# Patient Record
Sex: FEMALE | Race: WHITE | NOT HISPANIC OR LATINO
[De-identification: names, ages, dates, MRNs, and addresses within clinical notes are randomized per-mention and may not be internally consistent; named-entity substitution may affect disease eponyms.]

---

## 2018-12-19 ENCOUNTER — APPOINTMENT (OUTPATIENT)
Dept: OPHTHALMOLOGY | Facility: CLINIC | Age: 83
End: 2018-12-19
Payer: MEDICARE

## 2018-12-19 DIAGNOSIS — H40.002 PREGLAUCOMA, UNSPECIFIED, LEFT EYE: ICD-10-CM

## 2018-12-19 DIAGNOSIS — H35.3124 NONEXUDATIVE AGE-RELATED MACULAR DEGENERATION, LEFT EYE, ADVANCED ATROPHIC WITH SUBFOVEAL INVOLVEMENT: ICD-10-CM

## 2018-12-19 DIAGNOSIS — H35.3211 EXUDATIVE AGE-RELATED MACULAR DEGENERATION, RIGHT EYE, WITH ACTIVE CHOROIDAL NEOVASCULARIZATION: ICD-10-CM

## 2018-12-19 PROCEDURE — 92225: CPT | Mod: LT

## 2018-12-19 PROCEDURE — 92134 CPTRZ OPH DX IMG PST SGM RTA: CPT

## 2018-12-19 PROCEDURE — 92004 COMPRE OPH EXAM NEW PT 1/>: CPT

## 2020-01-01 ENCOUNTER — APPOINTMENT (OUTPATIENT)
Dept: HOME HEALTH SERVICES | Facility: HOME HEALTH | Age: 85
End: 2020-01-01

## 2020-01-14 ENCOUNTER — APPOINTMENT (OUTPATIENT)
Dept: INTERNAL MEDICINE | Facility: HOME HEALTH | Age: 85
End: 2020-01-14
Payer: MEDICARE

## 2020-01-14 ENCOUNTER — APPOINTMENT (OUTPATIENT)
Dept: INTERNAL MEDICINE | Facility: HOME HEALTH | Age: 85
End: 2020-01-14

## 2020-01-14 VITALS
DIASTOLIC BLOOD PRESSURE: 62 MMHG | RESPIRATION RATE: 18 BRPM | SYSTOLIC BLOOD PRESSURE: 154 MMHG | OXYGEN SATURATION: 98 % | TEMPERATURE: 98.7 F | HEART RATE: 74 BPM

## 2020-01-14 DIAGNOSIS — Z71.89 OTHER SPECIFIED COUNSELING: ICD-10-CM

## 2020-01-14 DIAGNOSIS — Z80.9 FAMILY HISTORY OF MALIGNANT NEOPLASM, UNSPECIFIED: ICD-10-CM

## 2020-01-14 DIAGNOSIS — Z78.9 OTHER SPECIFIED HEALTH STATUS: ICD-10-CM

## 2020-01-14 DIAGNOSIS — H54.3 UNQUALIFIED VISUAL LOSS, BOTH EYES: ICD-10-CM

## 2020-01-14 DIAGNOSIS — Z85.3 PERSONAL HISTORY OF MALIGNANT NEOPLASM OF BREAST: ICD-10-CM

## 2020-01-14 DIAGNOSIS — D50.9 IRON DEFICIENCY ANEMIA, UNSPECIFIED: ICD-10-CM

## 2020-01-14 PROCEDURE — G0506: CPT

## 2020-01-14 PROCEDURE — 99345 HOME/RES VST NEW HIGH MDM 75: CPT

## 2020-01-14 PROCEDURE — 99497 ADVNCD CARE PLAN 30 MIN: CPT

## 2020-01-14 RX ORDER — SITAGLIPTIN 100 MG/1
100 TABLET, FILM COATED ORAL
Qty: 90 | Refills: 3 | Status: ACTIVE | COMMUNITY
Start: 2020-01-14

## 2020-01-14 RX ORDER — LEVOTHYROXINE SODIUM 0.07 MG/1
75 TABLET ORAL
Qty: 90 | Refills: 3 | Status: ACTIVE | COMMUNITY
Start: 2020-01-14

## 2020-01-14 NOTE — REASON FOR VISIT
[Initial Evaluation] : an initial evaluation [Pre-Visit Preparation] : pre-visit preparation was done [Intercurrent Specialty/Sub-specialty Visits] : the patient has intercurrent specialty/sub-specialty visits [FreeTextEntry1] : Macular degeneration, Type 2 Diabetes with complications, right hip fracture s/p pinning, Lung Cancer [FreeTextEntry2] : Chart Review [FreeTextEntry3] : Orthopedics

## 2020-01-14 NOTE — COUNSELING
[Normal Weight - ( BMI  <25 )] : normal weight - ( BMI  <25 ) [Medical/Nutritional supplementation as prescribed] : medical/nutritional supplementation as prescribed [Non - Smoker] : non-smoker [Use assistive device to avoid falls] : use assistive device to avoid falls [] : diabetic screening [Decrease hospital use] : decrease hospital use [Discussed disease trajectory with patient/caregiver] : discussed disease trajectory with patient/caregiver [Minimize unnecessary interventions] : minimize unnecessary interventions [DNR] : Code Status: DNR [Completed Medical Orders for Life-Sustaining Treatment] : completed medical orders for life-sustaining treatment [Comfort] : Treatment Guidelines: Comfort [DNI] : Intubation: DNI [_____] : HCP: [unfilled] [[facility] _____[facility]/last verification date: [unfilled]

## 2020-01-14 NOTE — HISTORY OF PRESENT ILLNESS
[FreeTextEntry1] : blindness, hip fracture [FreeTextEntry2] : TUSHAR FERNÁNDEZ is an 89 year with Macular degeneration, Type 2 Diabetes with complications, right hip fracture s/p pinning, Lung Cancer seen today for initial home visit\par \par Accompanying patient today is TANYA Mayes and HCP/daughter contacted via cell phone.\par \par Patient's last hospitalization was 12/23- 12/28/19 for right hip fracture s/p pinning.\par \par Since hospitalization patient reports she continues to have occasional pain to her hip. Patient participating in home physical therapy and started to now stand. \par \par Patient reports no weight loss >10 lbs in the past 6 months. No changes in dentition or swallowing reported, No changes in hearing or vision reported. No changes in Cognition reported. Patient denies any symptoms of depression or anxiety. Patient is ADL dependent and IADL dependent. No changes in gait or falls reported. \par \par Patient's home environment is safe.\par  \par Goals of care discussed. MOLST completed. \par \par

## 2020-01-14 NOTE — REVIEW OF SYSTEMS
[Joint Stiffness] : joint stiffness [Muscle Weakness] : muscle weakness [Joint Pain] : joint pain [Muscle Pain] : muscle pain [Depression] : depression [Negative] : Heme/Lymph

## 2020-01-14 NOTE — HEALTH RISK ASSESSMENT
[HRA Reviewed] : Health risk assessment reviewed [Some assistance needed] : using telephone [Full assistance needed] : managing finances [Any fall with injury in past year] : Patient reported fall with injury in the past year [Yes] : The patient does have visual impairment [TimeGetUpGo] : 0

## 2020-01-14 NOTE — PHYSICAL EXAM
[Well Nourished] : well nourished [No Acute Distress] : no acute distress [Normal Oropharynx] : the oropharynx was normal [Normal Outer Ear/Nose] : the ears and nose were normal in appearance [No JVD] : no jugular venous distention [Normal TMs] : both tympanic membranes were normal [No Respiratory Distress] : no respiratory distress [Supple] : the neck was supple [Clear to Auscultation] : lungs were clear to auscultation bilaterally [No Accessory Muscle Use] : no accessory muscle use [Normal Rate] : heart rate was normal  [Regular Rhythm] : with a regular rhythm [Non Tender] : non-tender [Normal S1, S2] : normal S1 and S2 [Normal Bowel Sounds] : normal bowel sounds [Soft] : abdomen soft [Not Distended] : not distended [No Rectal Mass] : no rectal mass [Normal Sphincter Tone] : normal sphincter tone [Normal Post Cervical Nodes] : no posterior cervical lymphadenopathy [Normal Anterior Cervical Nodes] : no anterior cervical lymphadenopathy [No CVA Tenderness] : no ~M costovertebral angle tenderness [Cranial Nerves Intact] : cranial nerves 2-12 were intact [No Spinal Tenderness] : no spinal tenderness [No Joint Swelling] : no joint swelling seen [Oriented x3] : oriented to person, place, and time [No Motor Deficits] : the motor exam was normal [Normal Affect] : the affect was normal [Normal Mood] : the mood was normal [de-identified] : vision impaired [FreeTextEntry1] : +multiple small circular stools in rectal vault [de-identified] : stage 2 on right buttocks

## 2020-01-16 ENCOUNTER — LABORATORY RESULT (OUTPATIENT)
Age: 85
End: 2020-01-16

## 2020-01-17 RX ORDER — FLASH GLUCOSE SENSOR
KIT MISCELLANEOUS
Qty: 1 | Refills: 0 | Status: ACTIVE | COMMUNITY
Start: 2020-01-17 | End: 1900-01-01

## 2020-01-17 RX ORDER — FLASH GLUCOSE SENSOR
KIT MISCELLANEOUS
Qty: 2 | Refills: 3 | Status: ACTIVE | COMMUNITY
Start: 2020-01-17 | End: 1900-01-01

## 2020-02-10 RX ORDER — REPAGLINIDE 1 MG/1
1 TABLET ORAL
Qty: 270 | Refills: 1 | Status: DISCONTINUED | COMMUNITY
Start: 2020-01-14 | End: 2020-02-10

## 2020-02-11 ENCOUNTER — APPOINTMENT (OUTPATIENT)
Dept: HOME HEALTH SERVICES | Facility: HOME HEALTH | Age: 85
End: 2020-02-11
Payer: MEDICARE

## 2020-02-11 VITALS
TEMPERATURE: 98.1 F | RESPIRATION RATE: 18 BRPM | SYSTOLIC BLOOD PRESSURE: 138 MMHG | HEART RATE: 72 BPM | DIASTOLIC BLOOD PRESSURE: 78 MMHG | OXYGEN SATURATION: 98 %

## 2020-02-11 DIAGNOSIS — F33.0 MAJOR DEPRESSIVE DISORDER, RECURRENT, MILD: ICD-10-CM

## 2020-02-11 PROCEDURE — 99350 HOME/RES VST EST HIGH MDM 60: CPT

## 2020-02-11 NOTE — HISTORY OF PRESENT ILLNESS
[FreeTextEntry1] : blindness, hip fracture [FreeTextEntry2] : TUSHAR FERNÁNDEZ is an 89 year with Macular degeneration, Type 2 Diabetes with complications, right hip fracture s/p pinning, Lung Cancer seen today for follow up of chronic medical conditions.\par \par Accompanying patient today is TANYA Mayes and HCP/daughter.\par \par Since last home visit daughter reports patient with increase depressive symptoms. Patient's buttock wound is healing. No other complaints or events reported.\par \par Patient reports no weight loss >10 lbs in the past 6 months. No changes in dentition or swallowing reported, No changes in hearing or vision reported. No changes in Cognition reported. Patient denies any symptoms of depression or anxiety. Patient is ADL dependent and IADL dependent. No changes in gait or falls reported. \par \par Patient's home environment is safe.\par  \par Goals of care discussed. MOLST completed. \par \par

## 2020-02-11 NOTE — PHYSICAL EXAM
[No Acute Distress] : no acute distress [Well Nourished] : well nourished [Normal Outer Ear/Nose] : the ears and nose were normal in appearance [Normal Oropharynx] : the oropharynx was normal [No JVD] : no jugular venous distention [Normal TMs] : both tympanic membranes were normal [Supple] : the neck was supple [Clear to Auscultation] : lungs were clear to auscultation bilaterally [No Respiratory Distress] : no respiratory distress [No Accessory Muscle Use] : no accessory muscle use [Normal Rate] : heart rate was normal  [Regular Rhythm] : with a regular rhythm [Normal S1, S2] : normal S1 and S2 [Normal Bowel Sounds] : normal bowel sounds [Non Tender] : non-tender [Normal Sphincter Tone] : normal sphincter tone [Soft] : abdomen soft [Not Distended] : not distended [Normal Post Cervical Nodes] : no posterior cervical lymphadenopathy [No Rectal Mass] : no rectal mass [Normal Anterior Cervical Nodes] : no anterior cervical lymphadenopathy [No CVA Tenderness] : no ~M costovertebral angle tenderness [No Joint Swelling] : no joint swelling seen [No Spinal Tenderness] : no spinal tenderness [Cranial Nerves Intact] : cranial nerves 2-12 were intact [No Motor Deficits] : the motor exam was normal [Oriented x3] : oriented to person, place, and time [Normal Mood] : the mood was normal [Normal Affect] : the affect was normal [de-identified] : vision impaired [FreeTextEntry1] : +multiple small circular stools in rectal vault [de-identified] : stage 2 on right buttocks-healing, stage 1 left heel

## 2020-02-11 NOTE — REASON FOR VISIT
[Initial Evaluation] : an initial evaluation [Pre-Visit Preparation] : pre-visit preparation was done [Intercurrent Specialty/Sub-specialty Visits] : the patient has intercurrent specialty/sub-specialty visits [FreeTextEntry1] : Macular degeneration, Type 2 Diabetes with complications, right hip fracture s/p pinning, Lung Cancer [FreeTextEntry3] : Orthopedics [FreeTextEntry2] : Chart Review

## 2020-02-11 NOTE — REVIEW OF SYSTEMS
[Joint Pain] : joint pain [Joint Stiffness] : joint stiffness [Muscle Weakness] : muscle weakness [Muscle Pain] : muscle pain [Depression] : depression [Negative] : Heme/Lymph

## 2020-02-11 NOTE — CURRENT MEDS
[High Risk Medications Reviewed and Reconciled (Beers Criteria)] : high risk medications reviewed and reconciled [Medication and Allergies Reconciled] : medication and allergies reconciled [Reviewed patient reported medication adherence from Comprehensive Assessment] : Reviewed patient reported medication adherence from comprehensive assessment

## 2020-02-11 NOTE — COUNSELING
[Normal Weight - ( BMI  <25 )] : normal weight - ( BMI  <25 ) [Medical/Nutritional supplementation as prescribed] : medical/nutritional supplementation as prescribed [Use assistive device to avoid falls] : use assistive device to avoid falls [Non - Smoker] : non-smoker [] : eye exam [Decrease hospital use] : decrease hospital use [Discussed disease trajectory with patient/caregiver] : discussed disease trajectory with patient/caregiver [Completed Medical Orders for Life-Sustaining Treatment] : completed medical orders for life-sustaining treatment [Minimize unnecessary interventions] : minimize unnecessary interventions [DNR] : Code Status: DNR [Comfort] : Treatment Guidelines: Comfort [DNI] : Intubation: DNI [[facility]: _____[facility]last verification date: [unfilled] [_____] : HCP: [unfilled]

## 2020-02-20 ENCOUNTER — APPOINTMENT (OUTPATIENT)
Dept: INTERNAL MEDICINE | Facility: HOME HEALTH | Age: 85
End: 2020-02-20

## 2020-03-07 ENCOUNTER — TRANSCRIPTION ENCOUNTER (OUTPATIENT)
Age: 85
End: 2020-03-07

## 2020-03-09 ENCOUNTER — APPOINTMENT (OUTPATIENT)
Dept: HOME HEALTH SERVICES | Facility: HOME HEALTH | Age: 85
End: 2020-03-09
Payer: MEDICARE

## 2020-03-09 VITALS
TEMPERATURE: 98.1 F | DIASTOLIC BLOOD PRESSURE: 70 MMHG | HEART RATE: 74 BPM | RESPIRATION RATE: 20 BRPM | OXYGEN SATURATION: 98 % | SYSTOLIC BLOOD PRESSURE: 132 MMHG

## 2020-03-09 DIAGNOSIS — E03.9 HYPOTHYROIDISM, UNSPECIFIED: ICD-10-CM

## 2020-03-09 PROCEDURE — 99350 HOME/RES VST EST HIGH MDM 60: CPT

## 2020-03-09 NOTE — HISTORY OF PRESENT ILLNESS
[FreeTextEntry1] : blindness, hip fracture [FreeTextEntry2] : TUSHAR FERNÁNDEZ is an 89 year with Macular degeneration, Type 2 Diabetes with complications, right hip fracture s/p pinning, Lung Cancer seen today for follow up s/p ER visit for hallucinations.\par \par As per HHA patient woke up 3 am in the morning disoriented, screaming, and agitated. 911 was activated and patient was brought to the ER. Patient was found to have a UTI and sent home with antibiotics. Patient reports prior to ER visit she was having increased urinary frequency.\par \par No other complaints or events reported.

## 2020-03-09 NOTE — PHYSICAL EXAM
[No Acute Distress] : no acute distress [Well Nourished] : well nourished [Normal Outer Ear/Nose] : the ears and nose were normal in appearance [Normal Oropharynx] : the oropharynx was normal [Normal TMs] : both tympanic membranes were normal [No JVD] : no jugular venous distention [Supple] : the neck was supple [No Respiratory Distress] : no respiratory distress [Clear to Auscultation] : lungs were clear to auscultation bilaterally [No Accessory Muscle Use] : no accessory muscle use [Normal Rate] : heart rate was normal  [Regular Rhythm] : with a regular rhythm [Normal S1, S2] : normal S1 and S2 [Normal Bowel Sounds] : normal bowel sounds [Non Tender] : non-tender [Soft] : abdomen soft [Not Distended] : not distended [Normal Sphincter Tone] : normal sphincter tone [No Rectal Mass] : no rectal mass [Normal Post Cervical Nodes] : no posterior cervical lymphadenopathy [Normal Anterior Cervical Nodes] : no anterior cervical lymphadenopathy [No CVA Tenderness] : no ~M costovertebral angle tenderness [No Spinal Tenderness] : no spinal tenderness [No Joint Swelling] : no joint swelling seen [Cranial Nerves Intact] : cranial nerves 2-12 were intact [No Motor Deficits] : the motor exam was normal [Oriented x3] : oriented to person, place, and time [Normal Affect] : the affect was normal [Normal Mood] : the mood was normal [de-identified] : vision impaired [FreeTextEntry1] : +multiple small circular stools in rectal vault [de-identified] : stage 2 on right buttocks-healing, stage 1 left heel

## 2020-03-09 NOTE — COUNSELING
[Normal Weight - ( BMI  <25 )] : normal weight - ( BMI  <25 ) [Medical/Nutritional supplementation as prescribed] : medical/nutritional supplementation as prescribed [Non - Smoker] : non-smoker [Use assistive device to avoid falls] : use assistive device to avoid falls [] : eye exam [Decrease hospital use] : decrease hospital use [Minimize unnecessary interventions] : minimize unnecessary interventions [Discussed disease trajectory with patient/caregiver] : discussed disease trajectory with patient/caregiver [Completed Medical Orders for Life-Sustaining Treatment] : completed medical orders for life-sustaining treatment [DNR] : Code Status: DNR [Comfort] : Treatment Guidelines: Comfort [DNI] : Intubation: DNI [[facility]: [facility]last [facility]: [unfilled] [_____] : HCP: [unfilled]

## 2020-03-09 NOTE — REASON FOR VISIT
[Acute] : an acute visit [FreeTextEntry1] : s/p ER visit 03/07/2020 at [facility] for hallucinations

## 2020-03-20 DIAGNOSIS — K21.9 GASTRO-ESOPHAGEAL REFLUX DISEASE W/OUT ESOPHAGITIS: ICD-10-CM

## 2020-03-30 ENCOUNTER — APPOINTMENT (OUTPATIENT)
Dept: HOME HEALTH SERVICES | Facility: HOME HEALTH | Age: 85
End: 2020-03-30

## 2020-04-27 ENCOUNTER — APPOINTMENT (OUTPATIENT)
Dept: HOME HEALTH SERVICES | Facility: HOME HEALTH | Age: 85
End: 2020-04-27
Payer: MEDICARE

## 2020-04-27 VITALS — TEMPERATURE: 98.3 F | RESPIRATION RATE: 16 BRPM

## 2020-04-27 PROCEDURE — 99349 HOME/RES VST EST MOD MDM 40: CPT | Mod: 95

## 2020-04-27 NOTE — CURRENT MEDS
[Medication and Allergies Reconciled] : medication and allergies reconciled [Adherent to medications] : Patient is adherent to medications as prescribed [High Risk Medications Reviewed and Reconciled (Beers Criteria)] : high risk medications reviewed and reconciled

## 2020-04-27 NOTE — PHYSICAL EXAM
[No Acute Distress] : no acute distress [Well Nourished] : well nourished [Normal Outer Ear/Nose] : the ears and nose were normal in appearance [Normal Oropharynx] : the oropharynx was normal [Normal TMs] : both tympanic membranes were normal [No Respiratory Distress] : no respiratory distress [No Accessory Muscle Use] : no accessory muscle use [Normal Bowel Sounds] : normal bowel sounds [Non Tender] : non-tender [Soft] : abdomen soft [Not Distended] : not distended [Normal Sphincter Tone] : normal sphincter tone [No Rectal Mass] : no rectal mass [No Joint Swelling] : no joint swelling seen [No Spinal Tenderness] : no spinal tenderness [Cranial Nerves Intact] : cranial nerves 2-12 were intact [No Motor Deficits] : the motor exam was normal [Normal Affect] : the affect was normal [Oriented x3] : oriented to person, place, and time [Normal Mood] : the mood was normal [de-identified] : vision impaired [FreeTextEntry1] : +multiple small circular stools in rectal vault [de-identified] : extremities warm to touch [de-identified] : left third toe with bruising to proximal toe [de-identified] : sensation intact

## 2020-04-27 NOTE — HISTORY OF PRESENT ILLNESS
[Patient] : patient [Formal Caregiver] : formal caregiver [FreeTextEntry2] : TUSHAR FERNÁNDEZ is being seen for a visit provided via telehealth via [employer]. This visit was first attempted using [employer] real-time audio-visual technology, but was unable to be completed.\par \par TUSHAR FERNÁNDEZ was located in their home, [address]\[place] [address], at the time of the visit.\par \par The [employer], ISATU WHITE, was located remotely at their home in [place] at the time of the visit.\par \par The patient, TUSHAR FERNÁNDEZ and the [employer] Clinician, ISATU WHITE, participated in the teleheath encounter. Other participants included Home Attendant Rupal.\par \par TUSHAR FERNÁNDEZ (May  5 1926)consents to the use of telehealth. All questions related to telehealth answered.\par \par Patient reports gradual discoloration of her left foot third toe. Patient reports she may have accidentally hit her toe but she is unsure. Patient denies any fever,respiratory, GI, or neurological symptoms. \par

## 2020-04-28 ENCOUNTER — APPOINTMENT (OUTPATIENT)
Dept: HOME HEALTH SERVICES | Facility: HOME HEALTH | Age: 85
End: 2020-04-28
Payer: MEDICARE

## 2020-04-28 VITALS — TEMPERATURE: 98.1 F | RESPIRATION RATE: 16 BRPM | OXYGEN SATURATION: 98 %

## 2020-04-28 PROCEDURE — 99349 HOME/RES VST EST MOD MDM 40: CPT | Mod: 95

## 2020-04-28 RX ORDER — MULTIVITAMIN
TABLET ORAL
Qty: 30 | Refills: 0 | Status: DISCONTINUED | COMMUNITY
Start: 2019-12-27

## 2020-04-28 RX ORDER — SULFAMETHOXAZOLE AND TRIMETHOPRIM 800; 160 MG/1; MG/1
800-160 TABLET ORAL
Qty: 10 | Refills: 0 | Status: DISCONTINUED | COMMUNITY
Start: 2019-11-14

## 2020-04-28 RX ORDER — PROPRANOLOL HYDROCHLORIDE 80 MG/1
80 CAPSULE, EXTENDED RELEASE ORAL
Qty: 30 | Refills: 0 | Status: DISCONTINUED | COMMUNITY
Start: 2019-08-05

## 2020-04-28 RX ORDER — TRAMADOL HYDROCHLORIDE 50 MG/1
50 TABLET, COATED ORAL
Qty: 28 | Refills: 0 | Status: DISCONTINUED | COMMUNITY
Start: 2019-12-27

## 2020-04-28 RX ORDER — POLYETHYLENE GLYCOL 3350 17 G/17G
17 POWDER, FOR SOLUTION ORAL
Qty: 510 | Refills: 0 | Status: DISCONTINUED | COMMUNITY
Start: 2019-12-27

## 2020-04-28 RX ORDER — IRBESARTAN 150 MG/1
150 TABLET ORAL
Qty: 30 | Refills: 0 | Status: DISCONTINUED | COMMUNITY
Start: 2019-09-23

## 2020-04-28 RX ORDER — CEPHALEXIN 500 MG/1
500 CAPSULE ORAL
Qty: 4 | Refills: 0 | Status: DISCONTINUED | COMMUNITY
Start: 2020-03-07

## 2020-04-28 RX ORDER — ENOXAPARIN SODIUM 100 MG/ML
30 INJECTION SUBCUTANEOUS
Qty: 8 | Refills: 0 | Status: DISCONTINUED | COMMUNITY
Start: 2019-12-27

## 2020-04-28 RX ORDER — DOCUSATE SODIUM 100 MG/1
100 CAPSULE ORAL
Qty: 90 | Refills: 0 | Status: DISCONTINUED | COMMUNITY
Start: 2019-12-27

## 2020-04-28 NOTE — HISTORY OF PRESENT ILLNESS
[Patient] : patient [Formal Caregiver] : formal caregiver [FreeTextEntry2] : TUSHAR FERNÁNDEZ is being seen for a visit provided via telehealth via [employer]. This visit was first attempted using [employer] real-time audio-visual technology, but was unable to be completed.\par \par TUSHAR FERNÁNDEZ was located in their home, [address]\[place] [address], at the time of the visit.\par \par [employer], ISATU WHITE, was located remotely at their home in [place] at the time of the visit.\par \par The patient, TUSHAR FERNÁNDEZ and the [employer]ician, ISATU WHITE, participated in the teleheath encounter. Other participants included Stoney (Home Attendant)\par \par TUSHAR FERNÁNDEZ (May  5 1926) consents to the use of telehealth. All questions related to telehealth answered.\par  \par \par Patient reports bruising to bilateral lower extremities reported by new Home Attendant. Patient denies any fever,respiratory, GI, or neurological symptoms. \par

## 2020-04-28 NOTE — PHYSICAL EXAM
[No Acute Distress] : no acute distress [Well Nourished] : well nourished [Normal Outer Ear/Nose] : the ears and nose were normal in appearance [Normal Oropharynx] : the oropharynx was normal [Normal TMs] : both tympanic membranes were normal [No Respiratory Distress] : no respiratory distress [No Accessory Muscle Use] : no accessory muscle use [Normal Bowel Sounds] : normal bowel sounds [Non Tender] : non-tender [Soft] : abdomen soft [Not Distended] : not distended [Normal Sphincter Tone] : normal sphincter tone [No Rectal Mass] : no rectal mass [No Spinal Tenderness] : no spinal tenderness [No Joint Swelling] : no joint swelling seen [Cranial Nerves Intact] : cranial nerves 2-12 were intact [No Motor Deficits] : the motor exam was normal [Oriented x3] : oriented to person, place, and time [Normal Affect] : the affect was normal [Normal Mood] : the mood was normal [de-identified] : vision impaired [FreeTextEntry1] : +multiple small circular stools in rectal vault [de-identified] : extremities warm to touch [de-identified] : sensation intact [de-identified] : left third toe with bruising to proximal toe, bilateral lower extremitiy bruising- senile Purpura

## 2020-05-01 ENCOUNTER — APPOINTMENT (OUTPATIENT)
Dept: INTERNAL MEDICINE | Facility: HOME HEALTH | Age: 85
End: 2020-05-01

## 2020-05-18 DIAGNOSIS — M54.9 DORSALGIA, UNSPECIFIED: ICD-10-CM

## 2020-05-21 ENCOUNTER — LABORATORY RESULT (OUTPATIENT)
Age: 85
End: 2020-05-21

## 2020-05-22 ENCOUNTER — APPOINTMENT (OUTPATIENT)
Dept: HOME HEALTH SERVICES | Facility: HOME HEALTH | Age: 85
End: 2020-05-22

## 2020-05-28 ENCOUNTER — APPOINTMENT (OUTPATIENT)
Dept: HOME HEALTH SERVICES | Facility: HOME HEALTH | Age: 85
End: 2020-05-28
Payer: MEDICARE

## 2020-05-28 ENCOUNTER — TRANSCRIPTION ENCOUNTER (OUTPATIENT)
Age: 85
End: 2020-05-28

## 2020-05-28 VITALS — TEMPERATURE: 98 F | OXYGEN SATURATION: 98 % | RESPIRATION RATE: 16 BRPM

## 2020-05-28 DIAGNOSIS — N39.0 URINARY TRACT INFECTION, SITE NOT SPECIFIED: ICD-10-CM

## 2020-05-28 PROCEDURE — 99349 HOME/RES VST EST MOD MDM 40: CPT | Mod: 95

## 2020-05-28 NOTE — PHYSICAL EXAM
[No Acute Distress] : no acute distress [Well Nourished] : well nourished [Normal Outer Ear/Nose] : the ears and nose were normal in appearance [Normal Oropharynx] : the oropharynx was normal [Normal TMs] : both tympanic membranes were normal [No Respiratory Distress] : no respiratory distress [Normal Bowel Sounds] : normal bowel sounds [No Accessory Muscle Use] : no accessory muscle use [Non Tender] : non-tender [Soft] : abdomen soft [Not Distended] : not distended [Normal Sphincter Tone] : normal sphincter tone [No Rectal Mass] : no rectal mass [No Spinal Tenderness] : no spinal tenderness [No Joint Swelling] : no joint swelling seen [Cranial Nerves Intact] : cranial nerves 2-12 were intact [No Motor Deficits] : the motor exam was normal [Oriented x3] : oriented to person, place, and time [Normal Affect] : the affect was normal [Normal Mood] : the mood was normal [de-identified] : vision impaired [FreeTextEntry1] : +multiple small circular stools in rectal vault [de-identified] : extremities warm to touch [de-identified] : bilateral lower extremity bruising- senile Purpura [de-identified] : sensation intact

## 2020-05-28 NOTE — HEALTH RISK ASSESSMENT
[Full assistance needed] : managing finances [Any fall with injury in past year] : Patient reported fall with injury in the past year [Yes] : The patient does have visual impairment [TimeGetUpGo] : 0

## 2020-05-28 NOTE — HISTORY OF PRESENT ILLNESS
[Patient] : patient [Formal Caregiver] : formal caregiver [FreeTextEntry2] : TUSHAR FERNÁNDEZ is being seen for a visit provided via telehealth via [employer]. This visit was first attempted using [employer] real-time audio-visual technology, but was unable to be completed.\par \par TUSHAR FERNÁNDEZ was located in their home, [address]\[place] [address], at the time of the visit.\par \par [employer], ISATU WHITE, was located remotely at their home in [place] at the time of the visit.\par \par The patient, TUSHAR FERNÁNDEZ and the [employer], ISATU WHITE, participated in the teleheath encounter. Other participants included Tamra (Home Attendant)\par \par TUSHAR FERNÁNDEZ (May  5 1926) consents to the use of telehealth. All questions related to telehealth answered.\par  \par \par 89 year with Macular degeneration, Type 2 Diabetes with complications, right hip fracture s/p pinning, Lung Cancer seen today for follow up of chronic medical conditions.\par \par In ther interim patient was observed to be hallucinating more and very agitated. Patient was found to have UTI. Initially was started on Bactrim but culture showed resistance- medication changed to augmentin. Patient's behavior and urinary symptoms have improved.\par \par No other complaints reported today.

## 2020-06-02 ENCOUNTER — APPOINTMENT (OUTPATIENT)
Dept: HOME HEALTH SERVICES | Facility: HOME HEALTH | Age: 85
End: 2020-06-02

## 2020-06-02 RX ORDER — ONDANSETRON 4 MG/1
4 TABLET ORAL EVERY 8 HOURS
Qty: 90 | Refills: 0 | Status: DISCONTINUED | COMMUNITY
Start: 2020-03-20 | End: 2020-06-02

## 2020-06-02 RX ORDER — AMOXICILLIN AND CLAVULANATE POTASSIUM 500; 125 MG/1; MG/1
500-125 TABLET, FILM COATED ORAL
Qty: 20 | Refills: 0 | Status: DISCONTINUED | COMMUNITY
Start: 2020-05-24 | End: 2020-06-02

## 2020-06-02 RX ORDER — SULFAMETHOXAZOLE AND TRIMETHOPRIM 400; 80 MG/1; MG/1
400-80 TABLET ORAL
Qty: 14 | Refills: 0 | Status: DISCONTINUED | COMMUNITY
Start: 2020-05-22 | End: 2020-06-02

## 2020-06-11 ENCOUNTER — TRANSCRIPTION ENCOUNTER (OUTPATIENT)
Age: 85
End: 2020-06-11

## 2020-06-12 ENCOUNTER — TRANSCRIPTION ENCOUNTER (OUTPATIENT)
Age: 85
End: 2020-06-12

## 2020-06-12 ENCOUNTER — APPOINTMENT (OUTPATIENT)
Dept: HOME HEALTH SERVICES | Facility: HOME HEALTH | Age: 85
End: 2020-06-12
Payer: MEDICARE

## 2020-06-12 VITALS — TEMPERATURE: 98 F | RESPIRATION RATE: 18 BRPM

## 2020-06-12 DIAGNOSIS — M89.49 OTHER HYPERTROPHIC OSTEOARTHROPATHY, MULTIPLE SITES: ICD-10-CM

## 2020-06-12 PROCEDURE — 99349 HOME/RES VST EST MOD MDM 40: CPT | Mod: 95

## 2020-06-12 NOTE — HEALTH RISK ASSESSMENT
[Full assistance needed] : managing finances [Any fall with injury in past year] : Patient reported fall with injury in the past year [Yes] : The patient does have visual impairment [HRA Reviewed] : Health risk assessment reviewed [TimeGetUpGo] : 0

## 2020-06-12 NOTE — HISTORY OF PRESENT ILLNESS
[Patient] : patient [Formal Caregiver] : formal caregiver [FreeTextEntry2] : TUSHAR FERNÁNDEZ is being seen for a visit provided via telehealth via [employer]. This visit was first attempted using [employer] real-time audio-visual technology, but was unable to be completed.\par \par TUSHAR FERNÁNDEZ was located in their home, [address]\[place] [address], at the time of the visit.\par \par [employer], ISATU WHITE, was located remotely at their home in [place] at the time of the visit.\par \par The patient, TUSHAR FERNÁNDEZ and the [employer] Clinician, ISATU WHITE, participated in the teleheath encounter. Other participants included Tamra (Home Attendant)\par \par TUSHAR FERNÁNDEZ (May  5 1926) consents to the use of telehealth. All questions related to telehealth answered.\par  \par 89 year with Macular degeneration, Type 2 Diabetes with complications, right hip fracture s/p pinning, Lung Cancer seen today for acute visit for left hand swelling and pain. Patient reports pain was more severe yesterday than today and Tylenol is controlling symptoms.\par \par No other complaints reported today.

## 2020-06-24 ENCOUNTER — APPOINTMENT (OUTPATIENT)
Dept: HOME HEALTH SERVICES | Facility: HOME HEALTH | Age: 85
End: 2020-06-24

## 2020-06-24 RX ORDER — AMLODIPINE BESYLATE 10 MG/1
10 TABLET ORAL DAILY
Qty: 90 | Refills: 3 | Status: ACTIVE | COMMUNITY
Start: 2020-01-14

## 2020-06-29 ENCOUNTER — APPOINTMENT (OUTPATIENT)
Dept: HOME HEALTH SERVICES | Facility: HOME HEALTH | Age: 85
End: 2020-06-29

## 2020-07-17 ENCOUNTER — APPOINTMENT (OUTPATIENT)
Dept: HOME HEALTH SERVICES | Facility: HOME HEALTH | Age: 85
End: 2020-07-17

## 2020-09-01 ENCOUNTER — TRANSCRIPTION ENCOUNTER (OUTPATIENT)
Age: 85
End: 2020-09-01

## 2020-09-02 ENCOUNTER — APPOINTMENT (OUTPATIENT)
Dept: HOME HEALTH SERVICES | Facility: HOME HEALTH | Age: 85
End: 2020-09-02

## 2020-09-23 ENCOUNTER — APPOINTMENT (OUTPATIENT)
Dept: HOME HEALTH SERVICES | Facility: HOME HEALTH | Age: 85
End: 2020-09-23

## 2020-10-02 ENCOUNTER — APPOINTMENT (OUTPATIENT)
Dept: HOME HEALTH SERVICES | Facility: HOME HEALTH | Age: 85
End: 2020-10-02

## 2020-10-06 ENCOUNTER — LABORATORY RESULT (OUTPATIENT)
Age: 85
End: 2020-10-06

## 2020-10-06 LAB
ALBUMIN SERPL ELPH-MCNC: 4.2 G/DL
ALP BLD-CCNC: 100 U/L
ALT SERPL-CCNC: 19 U/L
ANION GAP SERPL CALC-SCNC: 15 MMOL/L
AST SERPL-CCNC: 22 U/L
BASOPHILS # BLD AUTO: 0.04 K/UL
BASOPHILS NFR BLD AUTO: 0.4 %
BILIRUB SERPL-MCNC: <0.2 MG/DL
BUN SERPL-MCNC: 27 MG/DL
CALCIUM SERPL-MCNC: 10.4 MG/DL
CHLORIDE SERPL-SCNC: 101 MMOL/L
CO2 SERPL-SCNC: 23 MMOL/L
CREAT SERPL-MCNC: 1.07 MG/DL
EOSINOPHIL # BLD AUTO: 0.19 K/UL
EOSINOPHIL NFR BLD AUTO: 1.8 %
GLUCOSE SERPL-MCNC: 99 MG/DL
HCT VFR BLD CALC: 34.3 %
HGB BLD-MCNC: 10.6 G/DL
IMM GRANULOCYTES NFR BLD AUTO: 0.4 %
LYMPHOCYTES # BLD AUTO: 3.69 K/UL
LYMPHOCYTES NFR BLD AUTO: 35.7 %
MAN DIFF?: NORMAL
MCHC RBC-ENTMCNC: 27.3 PG
MCHC RBC-ENTMCNC: 30.9 GM/DL
MCV RBC AUTO: 88.4 FL
MONOCYTES # BLD AUTO: 0.98 K/UL
MONOCYTES NFR BLD AUTO: 9.5 %
NEUTROPHILS # BLD AUTO: 5.39 K/UL
NEUTROPHILS NFR BLD AUTO: 52.2 %
PLATELET # BLD AUTO: 261 K/UL
POTASSIUM SERPL-SCNC: 4.9 MMOL/L
PROT SERPL-MCNC: 6.5 G/DL
RBC # BLD: 3.88 M/UL
RBC # FLD: 15.1 %
SODIUM SERPL-SCNC: 139 MMOL/L
WBC # FLD AUTO: 10.33 K/UL

## 2020-10-07 ENCOUNTER — APPOINTMENT (OUTPATIENT)
Dept: HOME HEALTH SERVICES | Facility: HOME HEALTH | Age: 85
End: 2020-10-07
Payer: MEDICARE

## 2020-10-07 VITALS
DIASTOLIC BLOOD PRESSURE: 70 MMHG | SYSTOLIC BLOOD PRESSURE: 128 MMHG | TEMPERATURE: 98.1 F | RESPIRATION RATE: 18 BRPM | HEART RATE: 72 BPM | OXYGEN SATURATION: 98 %

## 2020-10-07 DIAGNOSIS — E78.5 HYPERLIPIDEMIA, UNSPECIFIED: ICD-10-CM

## 2020-10-07 DIAGNOSIS — H35.3223 EXUDATIVE AGE-RELATED MACULAR DEGENERATION, LEFT EYE, WITH INACTIVE SCAR: ICD-10-CM

## 2020-10-07 LAB
APPEARANCE: ABNORMAL
BILIRUBIN URINE: NEGATIVE
BLOOD URINE: NEGATIVE
COLOR: YELLOW
GLUCOSE QUALITATIVE U: NEGATIVE
KETONES URINE: NEGATIVE
LEUKOCYTE ESTERASE URINE: ABNORMAL
NITRITE URINE: POSITIVE
PH URINE: 5
PROTEIN URINE: NORMAL
SPECIFIC GRAVITY URINE: 1.01
UROBILINOGEN URINE: NORMAL

## 2020-10-07 PROCEDURE — 99349 HOME/RES VST EST MOD MDM 40: CPT | Mod: 25

## 2020-10-07 PROCEDURE — 90662 IIV NO PRSV INCREASED AG IM: CPT

## 2020-10-07 PROCEDURE — G0008: CPT

## 2020-10-07 NOTE — COUNSELING
[Overweight - ( BMI 25.1 - 29.9 )] : overweight -  ( BMI 25.1 - 29.9 ) [TLC diet recommended] : TLC diet recommended [Non - Smoker] : non-smoker

## 2020-10-07 NOTE — PHYSICAL EXAM
[No Acute Distress] : no acute distress [Well Nourished] : well nourished [Normal Outer Ear/Nose] : the ears and nose were normal in appearance [Normal Oropharynx] : the oropharynx was normal [Normal TMs] : both tympanic membranes were normal [No Respiratory Distress] : no respiratory distress [No Accessory Muscle Use] : no accessory muscle use [Normal Bowel Sounds] : normal bowel sounds [Non Tender] : non-tender [Soft] : abdomen soft [Not Distended] : not distended [Normal Sphincter Tone] : normal sphincter tone [No Rectal Mass] : no rectal mass [No Spinal Tenderness] : no spinal tenderness [No Joint Swelling] : no joint swelling seen [Cranial Nerves Intact] : cranial nerves 2-12 were intact [No Motor Deficits] : the motor exam was normal [Oriented x3] : oriented to person, place, and time [Normal Affect] : the affect was normal [Normal Mood] : the mood was normal [de-identified] : vision impaired [FreeTextEntry1] : +multiple small circular stools in rectal vault [de-identified] : extremities warm to touch [de-identified] : bilateral lower extremity bruising- senile Purpura [de-identified] : sensation intact

## 2020-10-07 NOTE — HISTORY OF PRESENT ILLNESS
[Patient] : patient [Formal Caregiver] : formal caregiver [FreeTextEntry2] : 89 year with Macular degeneration, Type 2 Diabetes with complications, right hip fracture s/p pinning, Lung Cancer seen today for follow up of chronic medical conditions and flu administration.\par \par In the interim patient was observed to be hallucinating more and very agitated. Urinalysis pending.\par No other complaints reported today.\par \par Flu vaccine consent obtained and vaccine administered today.

## 2020-10-07 NOTE — REASON FOR VISIT
[Follow-Up] : a follow-up visit [FreeTextEntry1] :  Macular degeneration, Type 2 Diabetes with complications, right hip fracture s/p pinning, Lung Cancer and Flu vaccine administration

## 2020-10-09 ENCOUNTER — NON-APPOINTMENT (OUTPATIENT)
Age: 85
End: 2020-10-09

## 2020-10-27 ENCOUNTER — NON-APPOINTMENT (OUTPATIENT)
Age: 85
End: 2020-10-27

## 2020-12-23 PROBLEM — N39.0 ACUTE UTI: Status: RESOLVED | Noted: 2020-03-09 | Resolved: 2020-01-01

## 2021-01-01 ENCOUNTER — NON-APPOINTMENT (OUTPATIENT)
Age: 86
End: 2021-01-01

## 2021-01-01 ENCOUNTER — APPOINTMENT (OUTPATIENT)
Dept: HOME HEALTH SERVICES | Facility: HOME HEALTH | Age: 86
End: 2021-01-01

## 2021-01-01 ENCOUNTER — TRANSCRIPTION ENCOUNTER (OUTPATIENT)
Age: 86
End: 2021-01-01

## 2021-01-01 ENCOUNTER — APPOINTMENT (OUTPATIENT)
Dept: HOME HEALTH SERVICES | Facility: HOME HEALTH | Age: 86
End: 2021-01-01
Payer: MEDICARE

## 2021-01-01 ENCOUNTER — APPOINTMENT (OUTPATIENT)
Dept: HOME HEALTH SERVICES | Facility: HOME HEALTH | Age: 86
End: 2021-01-01
Payer: OTHER MISCELLANEOUS

## 2021-01-01 VITALS
RESPIRATION RATE: 18 BRPM | OXYGEN SATURATION: 96 % | TEMPERATURE: 97.1 F | DIASTOLIC BLOOD PRESSURE: 68 MMHG | SYSTOLIC BLOOD PRESSURE: 122 MMHG | HEART RATE: 78 BPM

## 2021-01-01 VITALS
OXYGEN SATURATION: 96 % | HEART RATE: 77 BPM | RESPIRATION RATE: 18 BRPM | SYSTOLIC BLOOD PRESSURE: 118 MMHG | TEMPERATURE: 98.1 F | DIASTOLIC BLOOD PRESSURE: 60 MMHG

## 2021-01-01 VITALS
HEART RATE: 70 BPM | RESPIRATION RATE: 16 BRPM | DIASTOLIC BLOOD PRESSURE: 62 MMHG | SYSTOLIC BLOOD PRESSURE: 122 MMHG | OXYGEN SATURATION: 97 % | TEMPERATURE: 97.3 F

## 2021-01-01 DIAGNOSIS — Z23 ENCOUNTER FOR IMMUNIZATION: ICD-10-CM

## 2021-01-01 DIAGNOSIS — S22.42XA MULTIPLE FRACTURES OF RIBS, LEFT SIDE, INITIAL ENCOUNTER FOR CLOSED FRACTURE: ICD-10-CM

## 2021-01-01 DIAGNOSIS — J18.9 PNEUMONIA, UNSPECIFIED ORGANISM: ICD-10-CM

## 2021-01-01 DIAGNOSIS — I10 ESSENTIAL (PRIMARY) HYPERTENSION: ICD-10-CM

## 2021-01-01 DIAGNOSIS — E11.8 TYPE 2 DIABETES MELLITUS WITH UNSPECIFIED COMPLICATIONS: ICD-10-CM

## 2021-01-01 DIAGNOSIS — Z20.822 CONTACT WITH AND (SUSPECTED) EXPOSURE TO COVID-19: ICD-10-CM

## 2021-01-01 DIAGNOSIS — D69.2 OTHER NONTHROMBOCYTOPENIC PURPURA: ICD-10-CM

## 2021-01-01 DIAGNOSIS — R53.81 OTHER MALAISE: ICD-10-CM

## 2021-01-01 DIAGNOSIS — Z86.19 PERSONAL HISTORY OF OTHER INFECTIOUS AND PARASITIC DISEASES: ICD-10-CM

## 2021-01-01 DIAGNOSIS — L89.312 PRESSURE ULCER OF RIGHT BUTTOCK, STAGE 2: ICD-10-CM

## 2021-01-01 DIAGNOSIS — E53.8 DEFICIENCY OF OTHER SPECIFIED B GROUP VITAMINS: ICD-10-CM

## 2021-01-01 DIAGNOSIS — R11.2 NAUSEA WITH VOMITING, UNSPECIFIED: ICD-10-CM

## 2021-01-01 DIAGNOSIS — F01.51 VASCULAR DEMENTIA WITH BEHAVIORAL DISTURBANCE: ICD-10-CM

## 2021-01-01 DIAGNOSIS — C34.90 MALIGNANT NEOPLASM OF UNSPECIFIED PART OF UNSPECIFIED BRONCHUS OR LUNG: ICD-10-CM

## 2021-01-01 DIAGNOSIS — Z87.81 PERSONAL HISTORY OF (HEALED) TRAUMATIC FRACTURE: ICD-10-CM

## 2021-01-01 DIAGNOSIS — Z92.29 PERSONAL HISTORY OF OTHER DRUG THERAPY: ICD-10-CM

## 2021-01-01 DIAGNOSIS — S99.922S UNSPECIFIED INJURY OF LEFT FOOT, SEQUELA: ICD-10-CM

## 2021-01-01 LAB
25(OH)D3 SERPL-MCNC: 39 NG/ML
ALBUMIN SERPL ELPH-MCNC: 3.7 G/DL
ALP BLD-CCNC: 106 U/L
ALT SERPL-CCNC: 15 U/L
ANION GAP SERPL CALC-SCNC: 10 MMOL/L
AST SERPL-CCNC: 15 U/L
BASOPHILS # BLD AUTO: 0.03 K/UL
BASOPHILS NFR BLD AUTO: 0.3 %
BILIRUB SERPL-MCNC: <0.2 MG/DL
BUN SERPL-MCNC: 26 MG/DL
CALCIUM SERPL-MCNC: 10.3 MG/DL
CHLORIDE SERPL-SCNC: 100 MMOL/L
CO2 SERPL-SCNC: 25 MMOL/L
CREAT SERPL-MCNC: 1.22 MG/DL
EOSINOPHIL # BLD AUTO: 0.24 K/UL
EOSINOPHIL NFR BLD AUTO: 2.6 %
ESTIMATED AVERAGE GLUCOSE: 114 MG/DL
FOLATE SERPL-MCNC: 3.7 NG/ML
GLUCOSE SERPL-MCNC: 189 MG/DL
HBA1C MFR BLD HPLC: 5.6 %
HCT VFR BLD CALC: 31.4 %
HGB BLD-MCNC: 9.9 G/DL
IMM GRANULOCYTES NFR BLD AUTO: 0.7 %
LYMPHOCYTES # BLD AUTO: 2.41 K/UL
LYMPHOCYTES NFR BLD AUTO: 25.7 %
MAN DIFF?: NORMAL
MCHC RBC-ENTMCNC: 27.7 PG
MCHC RBC-ENTMCNC: 31.5 GM/DL
MCV RBC AUTO: 87.7 FL
MONOCYTES # BLD AUTO: 0.88 K/UL
MONOCYTES NFR BLD AUTO: 9.4 %
NEUTROPHILS # BLD AUTO: 5.73 K/UL
NEUTROPHILS NFR BLD AUTO: 61.3 %
PLATELET # BLD AUTO: 293 K/UL
POTASSIUM SERPL-SCNC: 4.9 MMOL/L
PROT SERPL-MCNC: 6.4 G/DL
RBC # BLD: 3.58 M/UL
RBC # FLD: 13.3 %
SARS-COV-2 N GENE NPH QL NAA+PROBE: NOT DETECTED
SODIUM SERPL-SCNC: 136 MMOL/L
TSH SERPL-ACNC: 1.53 UIU/ML
VIT B12 SERPL-MCNC: 461 PG/ML
WBC # FLD AUTO: 9.36 K/UL

## 2021-01-01 PROCEDURE — 99349 HOME/RES VST EST MOD MDM 40: CPT

## 2021-01-01 PROCEDURE — 90662 IIV NO PRSV INCREASED AG IM: CPT

## 2021-01-01 PROCEDURE — G0008: CPT

## 2021-01-01 PROCEDURE — 99350 HOME/RES VST EST HIGH MDM 60: CPT

## 2021-01-01 PROCEDURE — 0031A: CPT

## 2021-01-01 RX ORDER — RISPERIDONE 0.25 MG/1
0.25 TABLET, FILM COATED ORAL TWICE DAILY
Qty: 180 | Refills: 0 | Status: DISCONTINUED | COMMUNITY
Start: 2020-03-11 | End: 2021-01-01

## 2021-01-01 RX ORDER — FOLIC ACID 1 MG/1
1 TABLET ORAL
Qty: 90 | Refills: 3 | Status: ACTIVE | COMMUNITY
Start: 2021-01-01 | End: 1900-01-01

## 2021-01-01 RX ORDER — FERROUS SULFATE 325(65) MG
325 (65 FE) TABLET ORAL DAILY
Qty: 90 | Refills: 3 | Status: DISCONTINUED | COMMUNITY
Start: 2020-01-14 | End: 2021-01-01

## 2021-01-01 RX ORDER — HYDROCORTISONE 10 MG/G
1 CREAM TOPICAL TWICE DAILY
Qty: 1 | Refills: 0 | Status: DISCONTINUED | COMMUNITY
Start: 2020-01-01 | End: 2021-01-01

## 2021-01-01 RX ORDER — FLUCONAZOLE 150 MG/1
150 TABLET ORAL
Qty: 3 | Refills: 0 | Status: DISCONTINUED | COMMUNITY
Start: 2020-01-01 | End: 2021-01-01

## 2021-01-01 RX ORDER — FLUCONAZOLE 150 MG/1
150 TABLET ORAL
Qty: 1 | Refills: 0 | Status: DISCONTINUED | COMMUNITY
Start: 2020-07-25 | End: 2021-01-01

## 2021-01-01 RX ORDER — LEVOFLOXACIN 500 MG/1
500 TABLET, FILM COATED ORAL DAILY
Qty: 7 | Refills: 0 | Status: DISCONTINUED | COMMUNITY
Start: 2021-01-01 | End: 2021-01-01

## 2021-01-01 RX ORDER — QUETIAPINE FUMARATE 25 MG/1
25 TABLET ORAL
Qty: 30 | Refills: 3 | Status: ACTIVE | COMMUNITY
Start: 2020-10-27 | End: 1900-01-01

## 2021-01-01 RX ORDER — OMEPRAZOLE 40 MG/1
40 CAPSULE, DELAYED RELEASE ORAL
Qty: 30 | Refills: 3 | Status: DISCONTINUED | COMMUNITY
Start: 2020-03-20 | End: 2021-01-01

## 2021-01-01 RX ORDER — PRAVASTATIN SODIUM 40 MG/1
40 TABLET ORAL
Qty: 90 | Refills: 3 | Status: DISCONTINUED | COMMUNITY
Start: 2020-01-14 | End: 2021-01-01

## 2021-01-01 RX ORDER — CEFUROXIME AXETIL 250 MG/1
250 TABLET ORAL
Qty: 14 | Refills: 0 | Status: DISCONTINUED | COMMUNITY
Start: 2020-10-09 | End: 2021-01-01

## 2021-01-01 RX ORDER — OXYCODONE AND ACETAMINOPHEN 5; 325 MG/1; MG/1
5-325 TABLET ORAL
Qty: 14 | Refills: 0 | Status: DISCONTINUED | COMMUNITY
Start: 2020-05-18 | End: 2021-01-01

## 2021-01-25 PROBLEM — E53.8 FOLATE DEFICIENCY: Status: ACTIVE | Noted: 2021-01-01

## 2021-02-03 PROBLEM — Z20.822 SUSPECTED COVID-19 VIRUS INFECTION: Status: RESOLVED | Noted: 2021-01-01 | Resolved: 2021-01-01

## 2021-03-31 PROBLEM — Z23 NEED FOR COVID-19 VACCINE: Status: ACTIVE | Noted: 2021-01-01

## 2021-10-20 PROBLEM — J18.9 PNEUMONIA OF LEFT LOWER LOBE DUE TO INFECTIOUS ORGANISM: Status: RESOLVED | Noted: 2021-01-01 | Resolved: 2021-01-01

## 2021-10-20 PROBLEM — Z86.19 HISTORY OF CANDIDIASIS OF VAGINA: Status: RESOLVED | Noted: 2020-07-25 | Resolved: 2021-01-01

## 2021-10-20 PROBLEM — R53.81 DECLINING FUNCTIONAL STATUS: Status: ACTIVE | Noted: 2021-01-01

## 2021-10-20 PROBLEM — S22.42XA CLOSED FRACTURE OF MULTIPLE RIBS OF LEFT SIDE, INITIAL ENCOUNTER: Status: RESOLVED | Noted: 2021-01-01 | Resolved: 2021-01-01

## 2021-10-20 PROBLEM — L89.312 PRESSURE INJURY OF RIGHT BUTTOCK, STAGE 2: Status: RESOLVED | Noted: 2020-02-11 | Resolved: 2021-01-01

## 2021-10-20 PROBLEM — R11.2 SEVERE NAUSEA AND VOMITING: Status: RESOLVED | Noted: 2020-03-20 | Resolved: 2021-01-01

## 2021-10-20 NOTE — PHYSICAL EXAM
[No Acute Distress] : no acute distress [Well Nourished] : well nourished [Normal Outer Ear/Nose] : the ears and nose were normal in appearance [Normal Oropharynx] : the oropharynx was normal [Normal TMs] : both tympanic membranes were normal [No Respiratory Distress] : no respiratory distress [No Accessory Muscle Use] : no accessory muscle use [Normal Bowel Sounds] : normal bowel sounds [Non Tender] : non-tender [Soft] : abdomen soft [Not Distended] : not distended [No Rectal Mass] : no rectal mass [No Spinal Tenderness] : no spinal tenderness [No Joint Swelling] : no joint swelling seen [Cranial Nerves Intact] : cranial nerves 2-12 were intact [No Motor Deficits] : the motor exam was normal [Oriented x3] : oriented to person, place, and time [Normal Affect] : the affect was normal [Normal Mood] : the mood was normal [de-identified] : vision impaired [de-identified] : extremities warm to touch [de-identified] : bilateral lower extremity bruising- senile Purpura [de-identified] : sensation intact

## 2021-10-20 NOTE — HISTORY OF PRESENT ILLNESS
[Patient] : patient [Formal Caregiver] : formal caregiver [FreeTextEntry2] : 10/20/2021 COVID SCREEN:\par Patient or caretaker denies fever, cough, trouble breathing, rash, vomiting. Patient has not been in close contact with anyone who is COVID-19 positive, or suspected of having COVID-19.\par \par N95 mask, gloves, eye wear and gown (if indicated) used during visit: Yes.\par \par Total face to face time with patient is 45 min.\par \par  89 year with Macular degeneration, Type 2 Diabetes with complications, right hip fracture s/p pinning, Lung Cancer seen today for follow up of chronic medical conditions and flu administration.\par \par No interim events reported. Patient continues to decline and on home hospice.\par \par No other complaints reported today.\par \par Flu vaccine consent obtained and vaccine administered today.

## 2021-10-20 NOTE — HEALTH RISK ASSESSMENT
[HRA Reviewed] : Health risk assessment reviewed [Full assistance needed] : managing finances [Any fall with injury in past year] : Patient reported fall with injury in the past year [Yes] : The patient does have visual impairment [TimeGetUpGo] : 0

## 2021-11-22 PROBLEM — C34.90 LUNG CANCER: Status: ACTIVE | Noted: 2020-01-14

## 2021-11-22 PROBLEM — S99.922S FOOT TRAUMA, LEFT, SEQUELA: Status: RESOLVED | Noted: 2020-04-27 | Resolved: 2021-01-01

## 2021-11-22 PROBLEM — F01.51 VASCULAR DEMENTIA WITH BEHAVIOR DISTURBANCE: Status: ACTIVE | Noted: 2020-03-11

## 2021-11-22 PROBLEM — D69.2 SENILE PURPURA: Status: ACTIVE | Noted: 2020-04-28

## 2021-11-22 PROBLEM — I10 ESSENTIAL HYPERTENSION: Status: ACTIVE | Noted: 2020-01-14

## 2021-11-22 PROBLEM — E11.8 TYPE 2 DIABETES WITH COMPLICATION: Status: ACTIVE | Noted: 2020-01-14

## 2021-11-22 PROBLEM — Z92.29 HISTORY OF INFLUENZA VACCINATION: Status: RESOLVED | Noted: 2020-09-03 | Resolved: 2021-01-01

## 2021-11-22 PROBLEM — Z87.81 HISTORY OF FRACTURE OF RIGHT HIP: Status: RESOLVED | Noted: 2020-01-14 | Resolved: 2021-01-01

## 2021-11-22 NOTE — PHYSICAL EXAM
[No Acute Distress] : no acute distress [Well Nourished] : well nourished [Normal Outer Ear/Nose] : the ears and nose were normal in appearance [Normal Oropharynx] : the oropharynx was normal [Normal TMs] : both tympanic membranes were normal [No Respiratory Distress] : no respiratory distress [No Accessory Muscle Use] : no accessory muscle use [Normal Bowel Sounds] : normal bowel sounds [Non Tender] : non-tender [Soft] : abdomen soft [Not Distended] : not distended [No Rectal Mass] : no rectal mass [No Spinal Tenderness] : no spinal tenderness [No Joint Swelling] : no joint swelling seen [Cranial Nerves Intact] : cranial nerves 2-12 were intact [No Motor Deficits] : the motor exam was normal [Oriented x3] : oriented to person, place, and time [Normal Affect] : the affect was normal [Normal Mood] : the mood was normal [de-identified] : vision impaired [de-identified] : extremities warm to touch [de-identified] : bilateral lower extremity bruising- senile Purpura- no broken skin [de-identified] : sensation intact

## 2021-11-22 NOTE — REASON FOR VISIT
[Follow-Up] : a follow-up visit [FreeTextEntry1] :  Macular degeneration, Type 2 Diabetes with complications, right hip fracture s/p pinning, Lung Cancer

## 2021-11-22 NOTE — HISTORY OF PRESENT ILLNESS
[Patient] : patient [Formal Caregiver] : formal caregiver [FreeTextEntry2] : 11/22/2021 COVID SCREEN:\par Patient or caretaker denies fever, cough, trouble breathing, rash, vomiting. Patient has not been in close contact with anyone who is COVID-19 positive, or suspected of having COVID-19.\par \par N95 mask, gloves, eye wear and gown (if indicated) used during visit: Yes.\par \par Total face to face time with patient is 45 min.\par \par  \par 89 year with Macular degeneration, Type 2 Diabetes with complications, right hip fracture s/p pinning, Lung Cancer seen today for follow up of chronic medical conditions.\par \par Patient continues to decline and on home hospice. As per daughter patient occasional has bruising to lower legs but understands from senile purpura. Patient also having difficulty swallowing regular foods- diet changed to mechanical soft.\par \par No other complaints reported today.\par

## 2021-11-22 NOTE — HEALTH RISK ASSESSMENT
[Full assistance needed] : managing finances [Yes] : The patient does have visual impairment [HRA Reviewed] : Health risk assessment reviewed [Patient not ambulatory (Wheelchair)] : Patient is not ambulatory (Wheelchair) [TimeGetUpGo] : 0 [de-identified] : Patient is bed bound

## 2021-12-06 ENCOUNTER — NON-APPOINTMENT (OUTPATIENT)
Age: 86
End: 2021-12-06

## 2022-08-22 NOTE — PHYSICAL EXAM
[No Acute Distress] : no acute distress [Well Nourished] : well nourished [Normal Oropharynx] : the oropharynx was normal [Normal Outer Ear/Nose] : the ears and nose were normal in appearance [No Respiratory Distress] : no respiratory distress [Normal TMs] : both tympanic membranes were normal [No Accessory Muscle Use] : no accessory muscle use [Normal Bowel Sounds] : normal bowel sounds [Non Tender] : non-tender [Soft] : abdomen soft [Not Distended] : not distended [No Rectal Mass] : no rectal mass [Normal Sphincter Tone] : normal sphincter tone [No Spinal Tenderness] : no spinal tenderness [Cranial Nerves Intact] : cranial nerves 2-12 were intact [No Motor Deficits] : the motor exam was normal [Oriented x3] : oriented to person, place, and time [Normal Affect] : the affect was normal [Normal Mood] : the mood was normal [No Clubbing, Cyanosis] : no clubbing  or cyanosis of the fingernails [de-identified] : left wrist slightly swollen, no discoloration, no change in temp [de-identified] : vision impaired [FreeTextEntry1] : +multiple small circular stools in rectal vault [de-identified] : bilateral lower extremity bruising- senile Purpura [de-identified] : sensation intact yes

## 2023-08-01 RX ORDER — SERTRALINE HYDROCHLORIDE 50 MG/1
50 TABLET, FILM COATED ORAL
Qty: 90 | Refills: 3 | Status: ACTIVE | COMMUNITY
Start: 2020-01-14